# Patient Record
(demographics unavailable — no encounter records)

---

## 2024-12-05 NOTE — CONSULT LETTER
[Dear  ___] : Dear  [unfilled], [Consult Letter:] : I had the pleasure of evaluating your patient, [unfilled]. [Sincerely,] : Sincerely, [DrRian  ___] : Dr. MARTINEZ [DrRian ___] : Dr. MARTINEZ

## 2024-12-06 NOTE — PHYSICAL EXAM
[EOMI] : extra ocular movement intact [Sclera nonicteric] : sclera nonicteric [Supple] : supple [No Supraclavicular Adenopathy] : no supraclavicular adenopathy [No Cervical Adenopathy] : no cervical adenopathy [Clear to Auscultation Bilat] : clear to auscultation bilaterally [Examined in the supine and seated position] : examined in the supine and seated position [No dominant masses] : no dominant masses in right breast  [No Nipple Retraction] : no left nipple retraction [No Nipple Discharge] : no left nipple discharge [No Axillary Lymphadenopathy] : no left axillary lymphadenopathy [Soft] : abdomen soft [Not Tender] : non-tender [de-identified] : Implant. Inframammary scar. [de-identified] : Inframammary scar. Redundant skin. 2 cm firm oval mass lower outer without change. No active rash.

## 2024-12-06 NOTE — HISTORY OF PRESENT ILLNESS
[FreeTextEntry1] : This is a 67 year old  female who was seen in 2015 for a skin lesion on her right areola which was excised. She was found to have left breast microcalcifications in January 2022 for which a six month follow-up was advised. She had that done and they were stable.  I had also sent her for an MRI for high risk screening.  The MRI showed an area of linear enhancement in the right breast.  MRI guided biopsy showed DCIS. A stereotactic biopsy was then performed of the left calcifications and LCIS was found.  She opted to undergo bilateral nipple sparing mastectomies with Magtrace injections and expanders (Dr. Ordoñez) on 11/3/2022.  Pathology showed LCIS in the left breast and DCIS in the right breast. The right expander deflated. She had the implant exchange in January 23.  The left alloderm had not incorporated and she had a drain on that side.  After the drain came out she started having low grade fevers of 100.6, 3 times.  The left side was more swollen and tighter than the right. She was started on Bactrim. Ultrasound did not show any collection.  The implant was removed in March 23 and the fevers resolved.    She has not yet scheduled surgery to replace the left expander. Her daughter is still have medical issues with unexplained proteinuria/interstitial nephritis.

## 2024-12-06 NOTE — PHYSICAL EXAM
[EOMI] : extra ocular movement intact [Sclera nonicteric] : sclera nonicteric [Supple] : supple [No Supraclavicular Adenopathy] : no supraclavicular adenopathy [No Cervical Adenopathy] : no cervical adenopathy [Clear to Auscultation Bilat] : clear to auscultation bilaterally [Examined in the supine and seated position] : examined in the supine and seated position [No dominant masses] : no dominant masses in right breast  [No Nipple Retraction] : no left nipple retraction [No Nipple Discharge] : no left nipple discharge [No Axillary Lymphadenopathy] : no left axillary lymphadenopathy [Soft] : abdomen soft [Not Tender] : non-tender [de-identified] : Implant. Inframammary scar. [de-identified] : Inframammary scar. Redundant skin. 2 cm firm oval mass lower outer without change. No active rash.

## 2024-12-18 NOTE — REVIEW OF SYSTEMS
[Diarrhea] : diarrhea [Fatigue] : no fatigue [Decreased Appetite] : appetite not decreased [Recent Weight Gain (___ Lbs)] : no recent weight gain [Recent Weight Loss (___ Lbs)] : no recent weight loss [Visual Field Defect] : no visual field defect [Blurred Vision] : no blurred vision [Dysphagia] : no dysphagia [Neck Pain] : no neck pain [Dysphonia] : no dysphonia [Chest Pain] : no chest pain [Palpitations] : no palpitations [Constipation] : no constipation [Polyuria] : no polyuria [Dysuria] : no dysuria [Dry Skin] : no dry skin [Hair Loss] : no hair loss [Headaches] : no headaches [Tremors] : no tremors [Depression] : no depression [Anxiety] : no anxiety [Polydipsia] : no polydipsia [FreeTextEntry2] : weight stable [FreeTextEntry7] : chronic diarrhea - seen gastro

## 2024-12-18 NOTE — HISTORY OF PRESENT ILLNESS
[Continuous Glucose Monitoring] : Continuous Glucose Monitoring: Yes [Verito] : Verito [FreeTextEntry1] : HISTORY: breast cancer s/p B/L mastectomy and reconstruction, then had infection and had one implant removed. Has RA, seeing rheumatology.  Daughter, 19 years old, just diagnosed with glomerulonephritis  Type: 2 Severity: moderate Duration: diagnosed 2007  Associated symptoms- Last eye exam: December 2023, no DR per patient. Last foot exam: denies pain/numbness/tingling in B/L feet Kidney disease: none Heart disease: none  Modifying factors- Current meds for glycemic control: Metformin 1000 mg BID Jardiance 25 mg daily Prandin 0.5 mg with meals  Past meds: abnormal lipase on Ozempic, Tradjenta ineffective.  Diet: started [employer] a few months ago but not recently due to daughter's illness Weight: stable Exercise: walking on treadmill 15-20 minutes daily  SMBG Verito 83% in target range, 17% high and 0% lows Current  ------------------------------------------------ Hypothyroidism Current regimen: Synthroid 100 mcg daily, takes appropriately. [FreeTextEntry2] : 83 [FreeTextEntry3] : 17 [FreeTextEntry4] : 0 [de-identified] : 6.7 [FreeTextEntry5] : 140 [FreeTextEntry6] : 29.3

## 2024-12-18 NOTE — ASSESSMENT
[Levothyroxine] : The patient was instructed to take Levothyroxine on an empty stomach, separate from vitamins, and wait at least 30 minutes before eating [FreeTextEntry1] : 67 year old female with T2DM, hypothyroidism, and hyperlipidemia. History of abnormal lipase on Ozempic, although MRI was negative for pancreatitis. She has RA.  1. T2DM- controlled -Continue Metformin and Jardiance. -Continue Prandin 0.5 mg before breakfast and lunch, increase to 1 mg before dinner to improve hyperglycemia due to dinner. -Continue SMBG with Freestyle Verito 3. Patient willing to pay OOP for sensor. -Repeat A1c before next visit  2. Hypothyroidism clnically euthyroid on replacement. TSH pending -Continue current dose of LT4. -Repeat TFTs before next visit  3. Hyperlipidemia controlled, continue statin. -Repeat lipids before next visit.   4. Obesity -Continue with lifestyle modifications as above. -Lipase elevation on Ozempic.  RTO in 3 months with MD.  This patient with diabetes - Injects insulin 1+ times daily - Is currently on CGM, testing glucose continuously - Has been seen in the office by a provider within the last six months to review CGM data with their provider CGM is medically necessary for this pt. - CGM will improve/maintain A1c - CGM will reduce hypoglycemic events Verito and Dexcom each require one test strip daily to use in case of sensor failure or for blood glucose verification or during sensor warmup.

## 2025-06-06 NOTE — PROCEDURE
[FreeTextEntry1] : Left mastectomy site ultrasound [FreeTextEntry2] : Assess palpable mass [FreeTextEntry3] : The left lateral mastectomy area shows two adjacent mixed echogenicity masses [id number] and [id number] mm

## 2025-06-06 NOTE — PHYSICAL EXAM
[de-identified] : Implant. Inframammary scar. [de-identified] : Inframammary scar. Redundant skin. 2 cm firm bilobed oval mass lower outer without change.

## 2025-06-06 NOTE — PROCEDURE
[FreeTextEntry1] : Left mastectomy site ultrasound [FreeTextEntry2] : Assess palpable mass [FreeTextEntry3] : The left lateral mastectomy area shows two adjacent mixed echogenicity masses [id number] and [id number] mm

## 2025-06-06 NOTE — HISTORY OF PRESENT ILLNESS
[FreeTextEntry1] : This is a 67 year old  female who was seen in 2015 for a skin lesion on her right areola which was excised. She was found to have left breast microcalcifications in January 2022 for which a six month follow-up was advised. She had that done and they were stable.  I had also sent her for an MRI for high risk screening.  The MRI showed an area of linear enhancement in the right breast.  MRI guided biopsy showed DCIS. A stereotactic biopsy was then performed of the left calcifications and LCIS was found.  She opted to undergo bilateral nipple sparing mastectomies with Magtrace injections and expanders (Dr. Ordoñez) on 11/3/2022.  Pathology showed LCIS in the left breast and DCIS in the right breast. The right expander deflated. She had the implant exchange in January 23.  The left alloderm had not incorporated and she had a drain on that side.  After the drain came out she started having low grade fevers of 100.6, 3 times.  The left side was more swollen and tighter than the right. She was started on Bactrim. Ultrasound did not show any collection.  The implant was removed in March 23 and the fevers resolved.    She has no present complaints.  Her daughter is still having significant medical issues with unexplained proteinuria/interstitial nephritis.

## 2025-06-06 NOTE — PHYSICAL EXAM
[de-identified] : Implant. Inframammary scar. [de-identified] : Inframammary scar. Redundant skin. 2 cm firm bilobed oval mass lower outer without change.